# Patient Record
Sex: FEMALE | URBAN - METROPOLITAN AREA
[De-identification: names, ages, dates, MRNs, and addresses within clinical notes are randomized per-mention and may not be internally consistent; named-entity substitution may affect disease eponyms.]

---

## 2020-11-12 ENCOUNTER — APPOINTMENT (RX ONLY)
Dept: URBAN - METROPOLITAN AREA CLINIC 55 | Facility: CLINIC | Age: 75
Setting detail: DERMATOLOGY
End: 2020-11-12

## 2020-11-12 DIAGNOSIS — Z41.9 ENCOUNTER FOR PROCEDURE FOR PURPOSES OTHER THAN REMEDYING HEALTH STATE, UNSPECIFIED: ICD-10-CM

## 2020-11-12 PROCEDURE — ? FILLERS

## 2020-11-12 NOTE — PROCEDURE: FILLERS
Nasolabial Folds Filler Volume In Cc: 0
Lot #: [id number]
Detail Level: Simple
Include Cannula Information In Note?: No
Filler: Oneida Espinoza
Post-Care Instructions: After care instructions were provided to the patient.
Marionette Lines Filler Volume In Cc: 1
Consent: Written consent was obtained.
Include Cannula Size?: 27G
Anesthesia Volume In Cc: 0.5
Expiration Date (Month Year): 02/28/2023
Expiration Date (Month Year): 2/28/2023
Map Statment: See Attach Map for Complete Details
Anesthesia Type: 1% lidocaine with epinephrine

## 2020-12-09 ENCOUNTER — APPOINTMENT (RX ONLY)
Dept: URBAN - METROPOLITAN AREA CLINIC 55 | Facility: CLINIC | Age: 75
Setting detail: DERMATOLOGY
End: 2020-12-09

## 2020-12-09 DIAGNOSIS — Z41.9 ENCOUNTER FOR PROCEDURE FOR PURPOSES OTHER THAN REMEDYING HEALTH STATE, UNSPECIFIED: ICD-10-CM

## 2020-12-09 PROCEDURE — ? PLATELET RICH PLASMA INJECTION

## 2020-12-09 NOTE — PROCEDURE: PLATELET RICH PLASMA INJECTION
Depth In Mm (Will Not Render If 0): 0
Detail Level: Zone
Treatment Number (Optional): 4
Which Technique?: Default
Consent: Written consent obtained, risks reviewed including but not limited to pain, scarring, infection and incomplete improvement.  Patient understands the procedure is cosmetic in nature and will require out of pocket payment.
Post-Care Instructions: After the procedure, take precautions agains sun exposure. Do not apply sunscreen for 12 hours after the procedure. Do not apply make-up for 12 hours after the procedure. Avoid alcohol based toners for 10-14 days. After 2-3 days patients can return to their regular skin regimen.
Amount Of Blood Collected (Optional - Include Units): 22 mL
Show Additional Techniques: Yes
Amount Of Blood Processed (Optional - Include Units): 12 mL

## 2021-01-15 ENCOUNTER — APPOINTMENT (RX ONLY)
Dept: URBAN - METROPOLITAN AREA CLINIC 55 | Facility: CLINIC | Age: 76
Setting detail: DERMATOLOGY
End: 2021-01-15

## 2021-02-11 ENCOUNTER — APPOINTMENT (RX ONLY)
Dept: URBAN - METROPOLITAN AREA CLINIC 55 | Facility: CLINIC | Age: 76
Setting detail: DERMATOLOGY
End: 2021-02-11

## 2021-02-11 DIAGNOSIS — Z41.9 ENCOUNTER FOR PROCEDURE FOR PURPOSES OTHER THAN REMEDYING HEALTH STATE, UNSPECIFIED: ICD-10-CM

## 2021-02-11 PROCEDURE — ? PLATELET RICH PLASMA INJECTION

## 2021-02-11 NOTE — PROCEDURE: PLATELET RICH PLASMA INJECTION
Amount Injected At This Location In Cc (Will Not Render If 0): 0
Show Additional Techniques: Yes
Treatment Number (Optional): 10
Detail Level: Zone
Amount Of Blood Processed (Optional - Include Units): 12 mL
Consent: Written consent obtained, risks reviewed including but not limited to pain, scarring, infection and incomplete improvement.  Patient understands the procedure is cosmetic in nature and will require out of pocket payment.
Amount Of Blood Collected (Optional - Include Units): 22 mL
Which Technique?: Default

## 2021-04-06 ENCOUNTER — APPOINTMENT (RX ONLY)
Dept: URBAN - METROPOLITAN AREA CLINIC 55 | Facility: CLINIC | Age: 76
Setting detail: DERMATOLOGY
End: 2021-04-06

## 2021-04-06 DIAGNOSIS — Z41.9 ENCOUNTER FOR PROCEDURE FOR PURPOSES OTHER THAN REMEDYING HEALTH STATE, UNSPECIFIED: ICD-10-CM

## 2021-04-06 PROCEDURE — ? PLATELET RICH PLASMA INJECTION

## 2021-04-06 NOTE — PROCEDURE: PLATELET RICH PLASMA INJECTION
Amount Injected At This Location In Cc (Will Not Render If 0): 0
Treatment Number (Optional): 11
Which Technique?: Default
Consent: Verbal consent obtained, risks reviewed including but not limited to pain, scarring, infection and incomplete improvement.  Patient understands the procedure is cosmetic in nature and will require out of pocket payment.
Amount Of Blood Collected (Optional - Include Units): 22 mL
Amount Of Blood Processed (Optional - Include Units): 12 mL
Standard Default Technique For Making Prp: Applied topically during MC[facility]
Post-Care Instructions: After the procedure, take precautions agains sun exposure. After 2-3 days patients can return to their regular skin regimen.
Show Additional Techniques: Yes
Detail Level: Zone

## 2021-06-09 ENCOUNTER — APPOINTMENT (RX ONLY)
Dept: URBAN - METROPOLITAN AREA CLINIC 55 | Facility: CLINIC | Age: 76
Setting detail: DERMATOLOGY
End: 2021-06-09

## 2021-06-09 DIAGNOSIS — Z41.9 ENCOUNTER FOR PROCEDURE FOR PURPOSES OTHER THAN REMEDYING HEALTH STATE, UNSPECIFIED: ICD-10-CM

## 2021-06-09 PROCEDURE — ? FILLERS

## 2021-06-09 NOTE — PROCEDURE: FILLERS
Additional Area 2 Volume In Cc: 0
Expiration Date (Month Year): 01/31/2023
Include Cannula Information In Note?: No
Consent: Written consent was obtained.
Post-Care Instructions: After care instructions were provided to the patient.
Include Cannula Information In Note?: Yes
Anesthesia Type: 1% lidocaine with epinephrine
Lot #: [id number]
Anesthesia Volume In Cc: 0.5
Include Cannula Size?: 27G
Filler: Oneida Espinoza
Expiration Date (Month Year): 12/31/23
Map Statment: See Attach Map for Complete Details
Detail Level: Simple

## 2021-07-06 ENCOUNTER — APPOINTMENT (RX ONLY)
Dept: URBAN - METROPOLITAN AREA CLINIC 55 | Facility: CLINIC | Age: 76
Setting detail: DERMATOLOGY
End: 2021-07-06

## 2021-07-06 DIAGNOSIS — Z41.9 ENCOUNTER FOR PROCEDURE FOR PURPOSES OTHER THAN REMEDYING HEALTH STATE, UNSPECIFIED: ICD-10-CM

## 2021-07-06 PROCEDURE — ? PLATELET RICH PLASMA INJECTION

## 2021-07-06 NOTE — PROCEDURE: PLATELET RICH PLASMA INJECTION
Detail Level: Zone
Treatment Number (Optional): 12
Depth In Mm (Will Not Render If 0): 0
Which Technique?: Default
Amount Of Blood Processed (Optional - Include Units): 12 mL
Amount Of Blood Collected (Optional - Include Units): 22 mL
Post-Care Instructions: After the procedure, take precautions agains sun exposure. After 2-3 days patients can return to their regular skin regimen.
Show Additional Techniques: Yes
Consent: Verbal consent obtained, risks reviewed including but not limited to pain, scarring, infection and incomplete improvement.  Patient understands the procedure is cosmetic in nature and will require out of pocket payment.

## 2021-09-07 ENCOUNTER — APPOINTMENT (RX ONLY)
Dept: URBAN - METROPOLITAN AREA CLINIC 55 | Facility: CLINIC | Age: 76
Setting detail: DERMATOLOGY
End: 2021-09-07

## 2021-09-07 DIAGNOSIS — Z41.9 ENCOUNTER FOR PROCEDURE FOR PURPOSES OTHER THAN REMEDYING HEALTH STATE, UNSPECIFIED: ICD-10-CM

## 2021-09-07 PROCEDURE — ? PLATELET RICH PLASMA INJECTION

## 2021-09-07 NOTE — PROCEDURE: PLATELET RICH PLASMA INJECTION
Amount Injected At This Location In Cc (Will Not Render If 0): 0
Amount Of Blood Collected (Optional - Include Units): 22 mL
Treatment Number (Optional): 13
Amount Of Blood Processed (Optional - Include Units): 9 mL
Post-Care Instructions: After the procedure, take precautions agains sun exposure. After 2-3 days patients can return to their regular skin regimen.
Detail Level: Zone
Which Technique?: Default
Consent: Verbal consent obtained, risks reviewed including but not limited to pain, scarring, infection and incomplete improvement.  Patient understands the procedure is cosmetic in nature and will require out of pocket payment.
Show Additional Techniques: Yes

## 2021-10-12 ENCOUNTER — APPOINTMENT (RX ONLY)
Dept: URBAN - METROPOLITAN AREA CLINIC 55 | Facility: CLINIC | Age: 76
Setting detail: DERMATOLOGY
End: 2021-10-12

## 2021-10-12 DIAGNOSIS — Z41.9 ENCOUNTER FOR PROCEDURE FOR PURPOSES OTHER THAN REMEDYING HEALTH STATE, UNSPECIFIED: ICD-10-CM

## 2021-10-12 PROCEDURE — ? PLATELET RICH PLASMA INJECTION

## 2021-10-12 NOTE — PROCEDURE: PLATELET RICH PLASMA INJECTION
Depth In Mm (Will Not Render If 0): 0
Detail Level: Zone
Treatment Number (Optional): 14
Which Technique?: Default
Consent: Verbal consent obtained, risks reviewed including but not limited to pain, scarring, infection and incomplete improvement.  Patient understands the procedure is cosmetic in nature and will require out of pocket payment.
Amount Of Blood Processed (Optional - Include Units): 10 mL
Post-Care Instructions: After the procedure, take precautions agains sun exposure. After 2-3 days patients can return to their regular skin regimen.
Show Additional Techniques: Yes
Amount Of Blood Collected (Optional - Include Units): 22 mL

## 2021-11-16 ENCOUNTER — APPOINTMENT (RX ONLY)
Dept: URBAN - METROPOLITAN AREA CLINIC 55 | Facility: CLINIC | Age: 76
Setting detail: DERMATOLOGY
End: 2021-11-16

## 2021-11-16 DIAGNOSIS — Z41.9 ENCOUNTER FOR PROCEDURE FOR PURPOSES OTHER THAN REMEDYING HEALTH STATE, UNSPECIFIED: ICD-10-CM

## 2021-11-16 PROCEDURE — ? PLATELET RICH PLASMA INJECTION

## 2021-11-16 NOTE — PROCEDURE: PLATELET RICH PLASMA INJECTION
Depth In Mm (Will Not Render If 0): 0
Consent: Verbal consent obtained, risks reviewed including but not limited to pain, scarring, infection and incomplete improvement.  Patient understands the procedure is cosmetic in nature and will require out of pocket payment.
Show Additional Techniques: Yes
Which Technique?: Default
Amount Of Blood Collected (Optional - Include Units): 22 mL
Post-Care Instructions: After the procedure, take precautions agains sun exposure. After 2-3 days patients can return to their regular skin regimen.
Amount Of Blood Processed (Optional - Include Units): 9 mL
Detail Level: Zone

## 2021-12-02 ENCOUNTER — APPOINTMENT (RX ONLY)
Dept: URBAN - METROPOLITAN AREA CLINIC 55 | Facility: CLINIC | Age: 76
Setting detail: DERMATOLOGY
End: 2021-12-02

## 2021-12-02 DIAGNOSIS — Z41.9 ENCOUNTER FOR PROCEDURE FOR PURPOSES OTHER THAN REMEDYING HEALTH STATE, UNSPECIFIED: ICD-10-CM

## 2021-12-02 PROCEDURE — ? FILLERS

## 2021-12-02 NOTE — PROCEDURE: FILLERS
Tear Troughs Filler Volume In Cc: 0
Include Cannula Size?: 27G
Consent: Written consent was obtained.
Filler: Oneida Espinoza
Include Cannula Information In Note?: No
Lot #: [id number]
Anesthesia Type: 1% lidocaine with epinephrine
Post-Care Instructions: After care instructions were provided to the patient.
Lot #: [id number]
Expiration Date (Month Year): 4/30/24
Include Cannula Information In Note?: Yes
Anesthesia Volume In Cc: 0.5
Map Statment: See Attach Map for Complete Details
Expiration Date (Month Year): 11/2023
Detail Level: Simple

## 2021-12-07 ENCOUNTER — APPOINTMENT (RX ONLY)
Dept: URBAN - METROPOLITAN AREA CLINIC 55 | Facility: CLINIC | Age: 76
Setting detail: DERMATOLOGY
End: 2021-12-07

## 2021-12-07 DIAGNOSIS — Z41.9 ENCOUNTER FOR PROCEDURE FOR PURPOSES OTHER THAN REMEDYING HEALTH STATE, UNSPECIFIED: ICD-10-CM

## 2021-12-07 PROCEDURE — ? PLATELET RICH PLASMA INJECTION

## 2021-12-07 NOTE — PROCEDURE: PLATELET RICH PLASMA INJECTION
Amount Injected At This Location In Cc (Will Not Render If 0): 0
Post-Care Instructions: After the procedure, take precautions agains sun exposure. After 2-3 days patients can return to their regular skin regimen.
Which Technique?: Default
Amount Of Blood Collected (Optional - Include Units): 22 mL
Consent: Verbal consent obtained, risks reviewed including but not limited to pain, scarring, infection and incomplete improvement.  Patient understands the procedure is cosmetic in nature and will require out of pocket payment.
Detail Level: Zone
Amount Of Blood Processed (Optional - Include Units): 9 mL
Show Additional Techniques: Yes

## 2022-01-11 ENCOUNTER — APPOINTMENT (RX ONLY)
Dept: URBAN - METROPOLITAN AREA CLINIC 55 | Facility: CLINIC | Age: 77
Setting detail: DERMATOLOGY
End: 2022-01-11

## 2022-01-11 DIAGNOSIS — Z41.9 ENCOUNTER FOR PROCEDURE FOR PURPOSES OTHER THAN REMEDYING HEALTH STATE, UNSPECIFIED: ICD-10-CM

## 2022-01-11 PROCEDURE — ? PLATELET RICH PLASMA INJECTION

## 2022-01-11 NOTE — PROCEDURE: PLATELET RICH PLASMA INJECTION
Post-Care Instructions: After the procedure, take precautions agains sun exposure. After 2-3 days patients can return to their regular skin regimen.
Depth In Mm (Will Not Render If 0): 0
Consent: Verbal consent obtained, risks reviewed including but not limited to pain, scarring, infection and incomplete improvement.  Patient understands the procedure is cosmetic in nature and will require out of pocket payment.
Amount Of Blood Collected (Optional - Include Units): 22 mL
Detail Level: Zone
Standard Default Technique For Making Prp: Cannula
Which Technique?: Default
Show Additional Techniques: Yes
Amount Of Blood Processed (Optional - Include Units): 10 mL

## 2022-04-26 ENCOUNTER — APPOINTMENT (RX ONLY)
Dept: URBAN - METROPOLITAN AREA CLINIC 55 | Facility: CLINIC | Age: 77
Setting detail: DERMATOLOGY
End: 2022-04-26

## 2022-04-26 DIAGNOSIS — Z41.9 ENCOUNTER FOR PROCEDURE FOR PURPOSES OTHER THAN REMEDYING HEALTH STATE, UNSPECIFIED: ICD-10-CM

## 2022-04-26 PROCEDURE — ? PLATELET RICH PLASMA INJECTION

## 2022-04-26 NOTE — PROCEDURE: PLATELET RICH PLASMA INJECTION
Show Additional Techniques: Yes
Amount Of Blood Collected (Optional - Include Units): 22 mL
Site Of Venipuncture?: Left AC
Amount Injected At This Location In Cc (Will Not Render If 0): 0
Venipuncture Paragraph: An alcohol pad was applied to the venipuncture site. Venipuncture was performed using a butterfly needle. Pressure and a bandaid was applied to the site. No complications were noted.
Standard Default Technique For Making Prp: 29g needles used for injections
Amount Of Blood Processed (Optional - Include Units): 9 mL
Post-Care Instructions: After the procedure, take precautions agains sun exposure. After 2-3 days patients can return to their regular skin regimen.
External Cooling Fan Speed: 5
Detail Level: Zone
Consent: Verbal consent obtained, risks reviewed including but not limited to pain, scarring, infection and incomplete improvement.  Patient understands the procedure is cosmetic in nature and will require out of pocket payment.
Anesthesia Type: 1% lidocaine with epinephrine
Which Technique?: Default

## 2022-06-14 ENCOUNTER — APPOINTMENT (RX ONLY)
Dept: URBAN - METROPOLITAN AREA CLINIC 55 | Facility: CLINIC | Age: 77
Setting detail: DERMATOLOGY
End: 2022-06-14

## 2022-06-14 DIAGNOSIS — Z41.9 ENCOUNTER FOR PROCEDURE FOR PURPOSES OTHER THAN REMEDYING HEALTH STATE, UNSPECIFIED: ICD-10-CM

## 2022-06-14 PROCEDURE — ? FILLERS

## 2022-06-14 NOTE — PROCEDURE: FILLERS
Additional Area 3 Volume In Cc: 0
Use Map Statement For Sites (Optional): No
Expiration Date (Month Year): 10/31/2023
Post-Care Instructions: After care instructions were provided to the patient.
Map Statment: See Attach Map for Complete Details
Lot #: [id number]
Include Cannula Size?: 27G
Include Cannula Information In Note?: Yes
Anesthesia Type: 1% lidocaine with epinephrine
Aspiration Statement: Aspiration was performed prior to injecting site with filler.
Detail Level: Simple
Anesthesia Volume In Cc: 0.5
Filler: Oneida Espinoza
Consent: Written consent was obtained.

## 2022-06-29 ENCOUNTER — APPOINTMENT (RX ONLY)
Dept: URBAN - METROPOLITAN AREA CLINIC 55 | Facility: CLINIC | Age: 77
Setting detail: DERMATOLOGY
End: 2022-06-29

## 2022-06-29 DIAGNOSIS — Z41.9 ENCOUNTER FOR PROCEDURE FOR PURPOSES OTHER THAN REMEDYING HEALTH STATE, UNSPECIFIED: ICD-10-CM

## 2022-06-29 PROCEDURE — ? PLATELET RICH PLASMA INJECTION

## 2022-06-29 NOTE — PROCEDURE: PLATELET RICH PLASMA INJECTION
Amount Injected At This Location In Cc (Will Not Render If 0): 0
Amount Of Blood Collected (Optional - Include Units): 22 mL
Standard Default Technique For Making Prp: 29g needles used for injections
Which Technique?: Default
External Cooling Fan Speed: 5
Show Additional Techniques: Yes
Detail Level: Zone
Anesthesia Type: 1% lidocaine with epinephrine
Consent: Verbal consent obtained, risks reviewed including but not limited to pain, scarring, infection and incomplete improvement.  Patient understands the procedure is cosmetic in nature and will require out of pocket payment.
Amount Of Blood Processed (Optional - Include Units): 10 mL
Post-Care Instructions: After the procedure, take precautions agains sun exposure. After 2-3 days patients can return to their regular skin regimen.
Venipuncture Paragraph: An alcohol pad was applied to the venipuncture site. Venipuncture was performed using a butterfly needle. Pressure and a bandaid was applied to the site. No complications were noted.
Site Of Venipuncture?: Left AC

## 2022-07-06 ENCOUNTER — APPOINTMENT (RX ONLY)
Dept: URBAN - METROPOLITAN AREA CLINIC 55 | Facility: CLINIC | Age: 77
Setting detail: DERMATOLOGY
End: 2022-07-06

## 2022-07-06 DIAGNOSIS — Z41.9 ENCOUNTER FOR PROCEDURE FOR PURPOSES OTHER THAN REMEDYING HEALTH STATE, UNSPECIFIED: ICD-10-CM

## 2022-07-06 PROCEDURE — ? PLATELET RICH PLASMA INJECTION

## 2022-07-06 NOTE — PROCEDURE: PLATELET RICH PLASMA INJECTION
Depth In Mm (Will Not Render If 0): 0
Site Of Venipuncture?: Left AC
Detail Level: Zone
Standard Default Technique For Making Prp: 29g needles used for injections
Venipuncture Paragraph: An alcohol pad was applied to the venipuncture site. Venipuncture was performed using a butterfly needle. Pressure and a bandaid was applied to the site. No complications were noted.
Consent: Verbal consent obtained, risks reviewed including but not limited to pain, scarring, infection and incomplete improvement.  Patient understands the procedure is cosmetic in nature and will require out of pocket payment.
Post-Care Instructions: After the procedure, take precautions agains sun exposure. After 2-3 days patients can return to their regular skin regimen.
Anesthesia Type: 1% lidocaine with epinephrine
Which Technique?: Default
Amount Of Blood Collected (Optional - Include Units): 22 mL
Show Additional Techniques: Yes
External Cooling Fan Speed: 5
Amount Of Blood Processed (Optional - Include Units): 10 mL

## 2022-08-16 ENCOUNTER — APPOINTMENT (RX ONLY)
Dept: URBAN - METROPOLITAN AREA CLINIC 55 | Facility: CLINIC | Age: 77
Setting detail: DERMATOLOGY
End: 2022-08-16

## 2022-08-16 DIAGNOSIS — Z41.9 ENCOUNTER FOR PROCEDURE FOR PURPOSES OTHER THAN REMEDYING HEALTH STATE, UNSPECIFIED: ICD-10-CM

## 2022-08-16 PROCEDURE — ? PLATELET RICH PLASMA INJECTION

## 2022-08-16 PROCEDURE — ? INVENTORY

## 2022-08-16 NOTE — PROCEDURE: PLATELET RICH PLASMA INJECTION
Depth In Mm (Will Not Render If 0): 0
Post-Care Instructions: Physical sunscreen applied post treatment and an ice pack provided. After 2-3 days patients can return to their regular skin regimen.
Which Technique?: Default
Venipuncture Paragraph: An alcohol pad was used to cleanse venipuncture site. Venipuncture was performed using a butterfly needle. Pressure and coflex was applied to the site. No complications were noted.
Anesthesia Type: 1% lidocaine with epinephrine
Show Additional Techniques: Yes
Amount Of Blood Collected (Optional - Include Units): 22 mL
Topical Anesthesia Type: 20% benzocaine, 8% lidocaine, 4% tetracaine
External Cooling Fan Speed: 5
Amount Of Blood Processed (Optional - Include Units): 9 mL
Consent obtained, risks reviewed.
Site Of Venipuncture?: Left AC
Detail Level: Zone

## 2022-10-20 ENCOUNTER — APPOINTMENT (RX ONLY)
Dept: URBAN - METROPOLITAN AREA CLINIC 55 | Facility: CLINIC | Age: 77
Setting detail: DERMATOLOGY
End: 2022-10-20

## 2022-10-20 DIAGNOSIS — Z41.9 ENCOUNTER FOR PROCEDURE FOR PURPOSES OTHER THAN REMEDYING HEALTH STATE, UNSPECIFIED: ICD-10-CM

## 2022-10-20 PROCEDURE — ? PLATELET RICH PLASMA INJECTION

## 2022-10-20 PROCEDURE — ? INVENTORY

## 2022-10-20 NOTE — PROCEDURE: PLATELET RICH PLASMA INJECTION
Treatment Number (Optional): 0
Site Of Venipuncture?: Left AC
Venipuncture Paragraph: An alcohol pad was used to cleanse venipuncture site. Venipuncture was performed using a butterfly needle. Pressure and coflex was applied to the site. No complications were noted.
Amount Of Blood Collected (Optional - Include Units): 22 mL
Anesthesia Type: 1% lidocaine with epinephrine
Detail Level: Zone
Consent obtained, risks reviewed.
Amount Of Blood Processed (Optional - Include Units): 9 mL
External Cooling Fan Speed: 5
Show Additional Techniques: Yes
Which Technique?: Default
Topical Anesthesia Type: 20% benzocaine, 8% lidocaine, 4% tetracaine
Post-Care Instructions: Physical sunscreen applied post treatment and an ice pack provided. After 2-3 days patients can return to their regular skin regimen.

## 2022-11-03 ENCOUNTER — APPOINTMENT (RX ONLY)
Dept: URBAN - METROPOLITAN AREA CLINIC 55 | Facility: CLINIC | Age: 77
Setting detail: DERMATOLOGY
End: 2022-11-03

## 2022-11-03 DIAGNOSIS — Z41.9 ENCOUNTER FOR PROCEDURE FOR PURPOSES OTHER THAN REMEDYING HEALTH STATE, UNSPECIFIED: ICD-10-CM

## 2022-11-03 PROCEDURE — ? FILLERS

## 2022-11-03 PROCEDURE — ? INVENTORY

## 2022-11-03 NOTE — PROCEDURE: FILLERS
Mid Face Filler Volume In Cc: 0
Include Cannula Information In Note?: Yes
Include Cannula Information In Note?: No
Anesthesia Volume In Cc: 0.5
Consent was obtained.
Post-Care Instructions: After care instructions were provided verbally and in writing.
Include Cannula Size?: 27G
Detail Level: Detailed
Filler: Oneida Espinoza
Map Statment: See Attach Map for Complete Details
Anesthesia Type: 1% lidocaine with epinephrine

## 2022-12-01 ENCOUNTER — APPOINTMENT (RX ONLY)
Dept: URBAN - METROPOLITAN AREA CLINIC 55 | Facility: CLINIC | Age: 77
Setting detail: DERMATOLOGY
End: 2022-12-01

## 2022-12-01 DIAGNOSIS — Z41.9 ENCOUNTER FOR PROCEDURE FOR PURPOSES OTHER THAN REMEDYING HEALTH STATE, UNSPECIFIED: ICD-10-CM

## 2022-12-01 PROCEDURE — ? PLATELET RICH PLASMA INJECTION

## 2022-12-01 PROCEDURE — ? INVENTORY

## 2022-12-01 NOTE — PROCEDURE: PLATELET RICH PLASMA INJECTION
Amount Injected At This Location In Cc (Will Not Render If 0): 0
Topical Anesthesia Type: 20% benzocaine, 8% lidocaine, 4% tetracaine
Site Of Venipuncture?: Left AC
Which Technique?: Default
Venipuncture Paragraph: An alcohol pad was used to cleanse venipuncture site. Venipuncture was performed using a butterfly needle. Pressure and coflex was applied to the site. No complications were noted.
Anesthesia Type: 1% lidocaine with epinephrine
Amount Of Blood Collected (Optional - Include Units): 22 mL
Show Additional Techniques: Yes
Consent obtained, risks reviewed.
External Cooling Fan Speed: 5
Amount Of Blood Processed (Optional - Include Units): 9 mL
Detail Level: Zone
Post-Care Instructions: Physical sunscreen applied post treatment and an ice pack provided. After 2-3 days patients can return to their regular skin regimen.

## 2023-03-31 ENCOUNTER — APPOINTMENT (RX ONLY)
Dept: URBAN - METROPOLITAN AREA CLINIC 55 | Facility: CLINIC | Age: 78
Setting detail: DERMATOLOGY
End: 2023-03-31

## 2023-03-31 DIAGNOSIS — Z41.9 ENCOUNTER FOR PROCEDURE FOR PURPOSES OTHER THAN REMEDYING HEALTH STATE, UNSPECIFIED: ICD-10-CM

## 2023-03-31 PROCEDURE — ? FILLERS

## 2023-03-31 PROCEDURE — ? INVENTORY

## 2023-03-31 NOTE — PROCEDURE: FILLERS
Additional Area 2 Volume In Cc: 0
Filler: RHA 3
Map Statment: See Attach Map for Complete Details
Filler: RHA 4
Include Documentation That Aspiration Was Performed Prior To Injecting Filler:: No
Include Cannula Information In Note?: Yes
Anesthesia Type: 1% lidocaine with epinephrine
Include Cannula Size?: 27G
Anesthesia Volume In Cc: 0.5
Detail Level: Detailed
Consent was obtained.
Post-Care Instructions: After care instructions were provided verbally and in writing.

## 2023-05-09 ENCOUNTER — APPOINTMENT (RX ONLY)
Dept: URBAN - METROPOLITAN AREA CLINIC 55 | Facility: CLINIC | Age: 78
Setting detail: DERMATOLOGY
End: 2023-05-09

## 2023-05-09 DIAGNOSIS — Z41.9 ENCOUNTER FOR PROCEDURE FOR PURPOSES OTHER THAN REMEDYING HEALTH STATE, UNSPECIFIED: ICD-10-CM

## 2023-05-09 PROCEDURE — ? INVENTORY

## 2023-05-09 PROCEDURE — ? PLATELET RICH PLASMA INJECTION

## 2023-05-09 PROCEDURE — ? COSMETIC FOLLOW-UP

## 2023-05-09 NOTE — PROCEDURE: PLATELET RICH PLASMA INJECTION
Site Of Venipuncture?: Left AC
Depth In Mm (Will Not Render If 0): 0
Amount Of Blood Collected (Optional - Include Units): 22 ml
Consent obtained, risks reviewed.
Topical Anesthesia Type: 20% benzocaine, 8% lidocaine, 4% tetracaine
Venipuncture Paragraph: An alcohol pad was used to cleanse venipuncture site. Venipuncture was performed using a butterfly needle. Pressure and coflex was applied to the site. No complications were noted.
Which Technique?: Default
Anesthesia Type: 1% lidocaine with epinephrine
Show Additional Techniques: Yes
Post-Care Instructions: Physical sunscreen applied post treatment and an ice pack provided. After 2-3 days patients can return to their regular skin regimen.
Detail Level: Zone
External Cooling Fan Speed: 5

## 2023-06-19 ENCOUNTER — APPOINTMENT (RX ONLY)
Dept: URBAN - METROPOLITAN AREA CLINIC 55 | Facility: CLINIC | Age: 78
Setting detail: DERMATOLOGY
End: 2023-06-19

## 2023-06-19 DIAGNOSIS — Z41.9 ENCOUNTER FOR PROCEDURE FOR PURPOSES OTHER THAN REMEDYING HEALTH STATE, UNSPECIFIED: ICD-10-CM

## 2023-06-19 PROCEDURE — ? INVENTORY

## 2023-06-19 PROCEDURE — ? PLATELET RICH PLASMA INJECTION

## 2023-06-19 PROCEDURE — ? COSMETIC CONSULTATION: GENERAL

## 2023-06-19 NOTE — PROCEDURE: PLATELET RICH PLASMA INJECTION
Depth In Mm (Will Not Render If 0): 0
Amount Of Blood Collected (Optional - Include Units): 22 mL
Anesthesia Type: 1% lidocaine with epinephrine
Venipuncture Paragraph: An alcohol pad was used to cleanse venipuncture site. Venipuncture was performed using a butterfly needle. Pressure and coflex was applied to the site. No complications were noted.
Amount Of Blood Processed (Optional - Include Units): 9 mL
Detail Level: Zone
External Cooling Fan Speed: 5
Consent obtained, risks reviewed.
Which Technique?: Default
Show Additional Techniques: Yes
Topical Anesthesia Type: 20% benzocaine, 8% lidocaine, 4% tetracaine
Site Of Venipuncture?: Left and right AC
Post-Care Instructions: Physical sunscreen applied post treatment and an ice pack provided. After 2-3 days patients can return to their regular skin regimen.

## 2023-07-17 ENCOUNTER — APPOINTMENT (RX ONLY)
Dept: URBAN - METROPOLITAN AREA CLINIC 55 | Facility: CLINIC | Age: 78
Setting detail: DERMATOLOGY
End: 2023-07-17

## 2023-07-17 DIAGNOSIS — Z41.9 ENCOUNTER FOR PROCEDURE FOR PURPOSES OTHER THAN REMEDYING HEALTH STATE, UNSPECIFIED: ICD-10-CM

## 2023-07-17 PROCEDURE — ? INVENTORY

## 2023-07-17 PROCEDURE — ? PLATELET RICH PLASMA INJECTION

## 2023-07-17 NOTE — PROCEDURE: PLATELET RICH PLASMA INJECTION
Depth In Mm (Will Not Render If 0): 0
Who Performed The Venipuncture?: RYANN Álvarez
Detail Level: Zone
Venipuncture Paragraph: An alcohol pad was used to cleanse venipuncture site. Venipuncture was performed using a butterfly needle. Pressure and coflex was applied to the site. No complications were noted.
Which Technique?: Default
External Cooling Fan Speed: 5
Number Of Tubes Drawn: 1
Show Additional Techniques: Yes
Consent obtained, risks reviewed.
Anesthesia Type: 1% lidocaine with epinephrine
Topical Anesthesia Type: 20% benzocaine, 8% lidocaine, 4% tetracaine
Post-Care Instructions: Physical sunscreen applied post treatment and an ice pack provided. After 2-3 days patients can return to their regular skin regimen.
Site Of Venipuncture?: Left AC

## 2023-08-11 ENCOUNTER — APPOINTMENT (RX ONLY)
Dept: URBAN - METROPOLITAN AREA CLINIC 55 | Facility: CLINIC | Age: 78
Setting detail: DERMATOLOGY
End: 2023-08-11

## 2023-08-11 DIAGNOSIS — Z41.9 ENCOUNTER FOR PROCEDURE FOR PURPOSES OTHER THAN REMEDYING HEALTH STATE, UNSPECIFIED: ICD-10-CM

## 2023-08-11 PROCEDURE — ? FILLERS

## 2023-08-11 PROCEDURE — ? INVENTORY

## 2023-08-11 NOTE — PROCEDURE: FILLERS
Tear Troughs Filler Volume In Cc: 0
Include Cannula Information In Note?: No
Filler: RHA Redensity
Detail Level: Detailed
Filler: RHA 4
Map Statment: See Attach Map for Complete Details
Include Cannula Information In Note?: Yes
Include Cannula Size?: 27G
Post-Care Instructions: After care instructions were provided verbally and in writing.
Anesthesia Type: 1% lidocaine with epinephrine
Consent was obtained.
Anesthesia Volume In Cc: 0.5

## 2023-08-21 ENCOUNTER — APPOINTMENT (RX ONLY)
Dept: URBAN - METROPOLITAN AREA CLINIC 55 | Facility: CLINIC | Age: 78
Setting detail: DERMATOLOGY
End: 2023-08-21

## 2023-08-21 DIAGNOSIS — Z41.9 ENCOUNTER FOR PROCEDURE FOR PURPOSES OTHER THAN REMEDYING HEALTH STATE, UNSPECIFIED: ICD-10-CM

## 2023-08-21 PROCEDURE — ? INVENTORY

## 2023-08-21 PROCEDURE — ? PLATELET RICH PLASMA INJECTION

## 2023-08-21 PROCEDURE — ? COSMETIC FOLLOW-UP

## 2023-08-21 NOTE — PROCEDURE: PLATELET RICH PLASMA INJECTION
Depth In Mm (Will Not Render If 0): 0
Anesthesia Type: 1% lidocaine with epinephrine
Detail Level: Zone
Venipuncture Paragraph: An alcohol pad was used to cleanse venipuncture site. Venipuncture was performed using a butterfly needle. Pressure and coflex was applied to the site. No complications were noted.
Show Additional Techniques: Yes
Consent obtained, risks reviewed.
Which Technique?: Default
External Cooling Fan Speed: 5
Post-Care Instructions: Physical sunscreen applied post treatment and an ice pack provided. After 2-3 days patients can return to their regular skin regimen.
Amount Of Blood Collected (Optional - Include Units): 22 mL
Site Of Venipuncture?: Left AC
Amount Of Blood Processed (Optional - Include Units): 9 mL
Topical Anesthesia Type: 20% benzocaine, 8% lidocaine, 4% tetracaine

## 2023-09-18 ENCOUNTER — APPOINTMENT (RX ONLY)
Dept: URBAN - METROPOLITAN AREA CLINIC 55 | Facility: CLINIC | Age: 78
Setting detail: DERMATOLOGY
End: 2023-09-18

## 2023-09-18 DIAGNOSIS — Z41.9 ENCOUNTER FOR PROCEDURE FOR PURPOSES OTHER THAN REMEDYING HEALTH STATE, UNSPECIFIED: ICD-10-CM

## 2023-09-18 PROCEDURE — ? COSMETIC FOLLOW-UP

## 2023-09-18 PROCEDURE — ? INVENTORY

## 2023-09-18 PROCEDURE — ? PLATELET RICH PLASMA INJECTION

## 2023-09-18 ASSESSMENT — LOCATION DETAILED DESCRIPTION DERM: LOCATION DETAILED: RIGHT ANTECUBITAL SKIN

## 2023-09-18 ASSESSMENT — LOCATION SIMPLE DESCRIPTION DERM: LOCATION SIMPLE: RIGHT UPPER ARM

## 2023-09-18 ASSESSMENT — LOCATION ZONE DERM: LOCATION ZONE: ARM

## 2023-09-18 NOTE — PROCEDURE: PLATELET RICH PLASMA INJECTION
Consent obtained, risks reviewed.
Post-Care Instructions: Physical sunscreen applied post treatment and an ice pack provided. After 2-3 days patients can return to their regular skin regimen.
Amount Of Blood Collected (Optional - Include Units): 22 mL
Site Of Venipuncture?: Left AC
Depth In Mm (Will Not Render If 0): 0
Detail Level: Zone
Amount Of Blood Processed (Optional - Include Units): 9 mL
Topical Anesthesia Type: 20% benzocaine, 8% lidocaine, 4% tetracaine
Which Technique?: Default
Venipuncture Paragraph: An alcohol pad was used to cleanse venipuncture site. Venipuncture was performed using a butterfly needle. Pressure and coflex was applied to the site. No complications were noted.
Anesthesia Type: 1% lidocaine with epinephrine
Show Additional Techniques: Yes
External Cooling Fan Speed: 5

## 2023-10-30 ENCOUNTER — APPOINTMENT (RX ONLY)
Dept: URBAN - METROPOLITAN AREA CLINIC 55 | Facility: CLINIC | Age: 78
Setting detail: DERMATOLOGY
End: 2023-10-30

## 2023-10-30 DIAGNOSIS — Z41.9 ENCOUNTER FOR PROCEDURE FOR PURPOSES OTHER THAN REMEDYING HEALTH STATE, UNSPECIFIED: ICD-10-CM

## 2023-10-30 PROCEDURE — ? INVENTORY

## 2023-10-30 PROCEDURE — ? LED

## 2023-10-30 PROCEDURE — ? COSMETIC FOLLOW-UP

## 2023-10-30 PROCEDURE — ? PLATELET RICH PLASMA INJECTION

## 2023-10-30 ASSESSMENT — LOCATION DETAILED DESCRIPTION DERM: LOCATION DETAILED: INFERIOR MID FOREHEAD

## 2023-10-30 ASSESSMENT — LOCATION SIMPLE DESCRIPTION DERM: LOCATION SIMPLE: INFERIOR FOREHEAD

## 2023-10-30 ASSESSMENT — LOCATION ZONE DERM: LOCATION ZONE: FACE

## 2023-10-30 NOTE — PROCEDURE: PLATELET RICH PLASMA INJECTION
Depth In Mm (Will Not Render If 0): 0
Venipuncture Paragraph: An alcohol pad was used to cleanse venipuncture site. Venipuncture was performed using a butterfly needle. Pressure and coflex was applied to the site. No complications were noted.
Detail Level: Zone
Amount Of Blood Processed (Optional - Include Units): 9 mL
Show Additional Techniques: Yes
Consent obtained, risks reviewed.
Which Technique?: Default
Anesthesia Type: 1% lidocaine with epinephrine
Post-Care Instructions: Physical sunscreen applied post treatment and an ice pack provided. After 2-3 days patients can return to their regular skin regimen.
External Cooling Fan Speed: 5
Topical Anesthesia Type: 20% benzocaine, 8% lidocaine, 4% tetracaine
Amount Of Blood Collected (Optional - Include Units): 22 mL
Site Of Venipuncture?: Left AC

## 2023-10-30 NOTE — PROCEDURE: LED
Detail Level: Zone
Consent: Written consent obtained.  The risks were reviewed with the patient including but not limited to: pigmentary changes, discomfort, scabbing, and redness.
Illumination Time: 15 min
Number Of Prepaid Treatments (Will Not Render If 0): 0
Post-Care Instructions: I reviewed with the patient in detail post-care instructions. Patient is to wear sun protection. Patients may expect sunburn like redness, discomfort and a temporary worsening of pimples.
Light Source: Red

## 2023-11-27 ENCOUNTER — APPOINTMENT (RX ONLY)
Dept: URBAN - METROPOLITAN AREA CLINIC 55 | Facility: CLINIC | Age: 78
Setting detail: DERMATOLOGY
End: 2023-11-27

## 2023-11-27 DIAGNOSIS — Z41.9 ENCOUNTER FOR PROCEDURE FOR PURPOSES OTHER THAN REMEDYING HEALTH STATE, UNSPECIFIED: ICD-10-CM

## 2023-11-27 PROCEDURE — ? PLATELET RICH PLASMA INJECTION

## 2023-11-27 PROCEDURE — ? INVENTORY

## 2023-11-27 PROCEDURE — ? COSMETIC FOLLOW-UP

## 2023-11-27 NOTE — PROCEDURE: PLATELET RICH PLASMA INJECTION
Amount Of Blood Collected (Optional - Include Units): 22 mL
Post-Care Instructions: Physical sunscreen applied post treatment and an ice pack provided. After 2-3 days patients can return to their regular skin regimen.
External Cooling Fan Speed: 5
Detail Level: Zone
Site Of Venipuncture?: Left AC
Amount Of Blood Processed (Optional - Include Units): 9 mL
Depth In Mm (Will Not Render If 0): 0
Topical Anesthesia Type: 20% benzocaine, 8% lidocaine, 4% tetracaine
Consent obtained, risks reviewed.
Venipuncture Paragraph: An alcohol pad was used to cleanse venipuncture site. Venipuncture was performed using a butterfly needle. Pressure and coflex was applied to the site. No complications were noted.
Which Technique?: Default
Show Additional Techniques: Yes
Anesthesia Type: 1% lidocaine with epinephrine

## 2024-01-19 ENCOUNTER — APPOINTMENT (RX ONLY)
Dept: URBAN - METROPOLITAN AREA CLINIC 55 | Facility: CLINIC | Age: 79
Setting detail: DERMATOLOGY
End: 2024-01-19

## 2024-01-19 DIAGNOSIS — Z41.9 ENCOUNTER FOR PROCEDURE FOR PURPOSES OTHER THAN REMEDYING HEALTH STATE, UNSPECIFIED: ICD-10-CM

## 2024-01-19 PROCEDURE — ? COSMETIC FOLLOW-UP

## 2024-01-19 PROCEDURE — ? INVENTORY

## 2024-01-19 PROCEDURE — ? PLATELET RICH PLASMA INJECTION

## 2024-01-19 NOTE — PROCEDURE: PLATELET RICH PLASMA INJECTION
Consent obtained, risks reviewed.
Depth In Mm (Will Not Render If 0): 0
External Cooling Fan Speed: 5
Topical Anesthesia Type: 20% benzocaine, 8% lidocaine, 4% tetracaine
Show Additional Techniques: Yes
Post-Care Instructions: Physical sunscreen applied post treatment and an ice pack provided. After 2-3 days patients can return to their regular skin regimen.
Amount Of Blood Collected (Optional - Include Units): 22 mL
Amount Of Blood Processed (Optional - Include Units): 9 mL
Detail Level: Zone
Site Of Venipuncture?: Right AC
Which Technique?: Default
Anesthesia Type: 1% lidocaine with epinephrine
Venipuncture Paragraph: An alcohol pad was used to cleanse venipuncture site. Venipuncture was performed using a butterfly needle. Pressure and coflex was applied to the site. No complications were noted.

## 2024-03-08 ENCOUNTER — APPOINTMENT (RX ONLY)
Dept: URBAN - METROPOLITAN AREA CLINIC 55 | Facility: CLINIC | Age: 79
Setting detail: DERMATOLOGY
End: 2024-03-08

## 2024-03-08 DIAGNOSIS — Z41.9 ENCOUNTER FOR PROCEDURE FOR PURPOSES OTHER THAN REMEDYING HEALTH STATE, UNSPECIFIED: ICD-10-CM

## 2024-03-08 PROCEDURE — ? INVENTORY

## 2024-03-08 PROCEDURE — ? PLATELET RICH PLASMA INJECTION

## 2024-03-08 PROCEDURE — ? COSMETIC FOLLOW-UP

## 2024-03-08 NOTE — PROCEDURE: PLATELET RICH PLASMA INJECTION
Post-Care Instructions: Physical sunscreen applied post treatment and an ice pack provided. After 2-3 days patients can return to their regular skin regimen.
Amount Of Blood Collected (Optional - Include Units): 22 mL
Amount Injected At This Location In Cc (Will Not Render If 0): 0
External Cooling Fan Speed: 5
Detail Level: Zone
Site Of Venipuncture?: Right AC
Amount Of Blood Processed (Optional - Include Units): 9 mL
Venipuncture Paragraph: An alcohol pad was used to cleanse venipuncture site. Venipuncture was performed using a butterfly needle. Pressure and coflex was applied to the site. No complications were noted.
Which Technique?: Default
Topical Anesthesia Type: 20% benzocaine, 8% lidocaine, 4% tetracaine
Show Additional Techniques: Yes
Consent obtained, risks reviewed.
Anesthesia Type: 1% lidocaine with epinephrine

## 2024-03-12 ENCOUNTER — APPOINTMENT (RX ONLY)
Dept: URBAN - METROPOLITAN AREA CLINIC 55 | Facility: CLINIC | Age: 79
Setting detail: DERMATOLOGY
End: 2024-03-12

## 2024-03-12 DIAGNOSIS — Z41.9 ENCOUNTER FOR PROCEDURE FOR PURPOSES OTHER THAN REMEDYING HEALTH STATE, UNSPECIFIED: ICD-10-CM

## 2024-03-12 PROCEDURE — ? INVENTORY

## 2024-03-12 PROCEDURE — ? FILLERS

## 2024-03-12 NOTE — PROCEDURE: FILLERS
Additional Area 1 Volume In Cc: 0
Include Cannula Size?: 27G
Consent was obtained.
Post-Care Instructions: After care instructions were provided verbally and in writing.
Detail Level: Detailed
Use Map Statement For Sites (Optional): No
Filler: [facility] 4
Filler: [facility] Redensity
Map Statment: See Attach Map for Complete Details
Anesthesia Type: 1% lidocaine with epinephrine
Include Cannula Information In Note?: Yes
Anesthesia Volume In Cc: 0.5

## 2024-07-09 ENCOUNTER — APPOINTMENT (RX ONLY)
Dept: URBAN - METROPOLITAN AREA CLINIC 55 | Facility: CLINIC | Age: 79
Setting detail: DERMATOLOGY
End: 2024-07-09

## 2024-07-09 DIAGNOSIS — Z41.9 ENCOUNTER FOR PROCEDURE FOR PURPOSES OTHER THAN REMEDYING HEALTH STATE, UNSPECIFIED: ICD-10-CM

## 2024-07-09 PROCEDURE — ? INVENTORY

## 2024-07-09 PROCEDURE — ? PLATELET RICH PLASMA INJECTION

## 2024-07-09 NOTE — PROCEDURE: PLATELET RICH PLASMA INJECTION
Consent obtained, risks reviewed.
Show Additional Techniques: Yes
Depth In Mm (Will Not Render If 0): 0
Anesthesia Type: 1% lidocaine with epinephrine
Detail Level: Zone
Venipuncture Paragraph: An alcohol pad was used to cleanse venipuncture site. Venipuncture was performed using a butterfly needle. Pressure and coflex was applied to the site. No complications were noted.
Amount Of Blood Processed (Optional - Include Units): 9 mL
Amount Of Blood Collected (Optional - Include Units): 22 mL
Site Of Venipuncture?: Right AC
Topical Anesthesia Type: 20% benzocaine, 8% lidocaine, 4% tetracaine
Post-Care Instructions: Physical sunscreen applied post treatment and an ice pack provided. After 2-3 days patients can return to their regular skin regimen.
Which Technique?: Default
External Cooling Fan Speed: 5

## 2024-08-06 ENCOUNTER — APPOINTMENT (RX ONLY)
Dept: URBAN - METROPOLITAN AREA CLINIC 55 | Facility: CLINIC | Age: 79
Setting detail: DERMATOLOGY
End: 2024-08-06

## 2024-08-06 DIAGNOSIS — Z41.9 ENCOUNTER FOR PROCEDURE FOR PURPOSES OTHER THAN REMEDYING HEALTH STATE, UNSPECIFIED: ICD-10-CM

## 2024-08-06 PROCEDURE — ? FILLERS

## 2024-08-06 PROCEDURE — ? INVENTORY

## 2024-08-06 NOTE — PROCEDURE: FILLERS
Include Cannula Information In Note?: Yes
Include Cannula Information In Note?: No
Anesthesia Type: 1% lidocaine with epinephrine
Lateral Face Filler Volume In Cc: 0
Anesthesia Volume In Cc: 0.5
Include Cannula Size?: 27G
Consent was obtained.
Detail Level: Detailed
Post-Care Instructions: After care instructions were provided verbally and in writing.
Filler: Restylane Contour
Map Statment: See Attach Map for Complete Details

## 2024-09-03 ENCOUNTER — APPOINTMENT (RX ONLY)
Dept: URBAN - METROPOLITAN AREA CLINIC 55 | Facility: CLINIC | Age: 79
Setting detail: DERMATOLOGY
End: 2024-09-03

## 2024-09-03 DIAGNOSIS — Z41.9 ENCOUNTER FOR PROCEDURE FOR PURPOSES OTHER THAN REMEDYING HEALTH STATE, UNSPECIFIED: ICD-10-CM

## 2024-09-03 PROCEDURE — ? INVENTORY

## 2024-09-03 PROCEDURE — ? PLATELET RICH PLASMA INJECTION

## 2024-09-03 PROCEDURE — ? COSMETIC FOLLOW-UP

## 2024-09-03 NOTE — PROCEDURE: PLATELET RICH PLASMA INJECTION
Treatment Number (Optional): 0
Site Of Venipuncture?: Left AC
Amount Of Blood Collected (Optional - Include Units): 22 mL
Post-Care Instructions: Physical sunscreen applied post treatment and an ice pack provided. After 2-3 days patients can return to their regular skin regimen.
Topical Anesthesia Type: 20% benzocaine, 8% lidocaine, 4% tetracaine
Anesthesia Type: 1% lidocaine with epinephrine
Venipuncture Paragraph: An alcohol pad was used to cleanse venipuncture site. Venipuncture was performed using a butterfly needle. Pressure and coflex was applied to the site. No complications were noted.
Detail Level: Zone
External Cooling Fan Speed: 5
Amount Of Blood Processed (Optional - Include Units): 9 mL
Show Additional Techniques: Yes
Consent obtained, risks reviewed.
Which Technique?: Default

## 2024-11-19 ENCOUNTER — APPOINTMENT (RX ONLY)
Dept: URBAN - METROPOLITAN AREA CLINIC 55 | Facility: CLINIC | Age: 79
Setting detail: DERMATOLOGY
End: 2024-11-19

## 2024-11-19 DIAGNOSIS — Z41.9 ENCOUNTER FOR PROCEDURE FOR PURPOSES OTHER THAN REMEDYING HEALTH STATE, UNSPECIFIED: ICD-10-CM

## 2024-11-19 PROCEDURE — ? PLATELET RICH PLASMA INJECTION

## 2024-11-19 PROCEDURE — ? INVENTORY

## 2024-11-19 NOTE — PROCEDURE: PLATELET RICH PLASMA INJECTION
Venipuncture Paragraph: An alcohol pad was used to cleanse venipuncture site. Venipuncture was performed using a butterfly needle. Pressure and coflex was applied to the site. No complications were noted.
Amount Injected At This Location In Cc (Will Not Render If 0): 0
Anesthesia Type: 1% lidocaine with epinephrine
Topical Anesthesia Type: 20% benzocaine, 8% lidocaine, 4% tetracaine
Detail Level: Zone
Show Additional Techniques: Yes
Amount Of Blood Collected (Optional - Include Units): 22 mL
Consent obtained, risks reviewed.
External Cooling Fan Speed: 5
Amount Of Blood Processed (Optional - Include Units): 9 mL
Site Of Venipuncture?: Left AC
Post-Care Instructions: Physical sunscreen applied post treatment and an ice pack provided. After 2-3 days patients can return to their regular skin regimen.
Which Technique?: Default

## 2024-12-03 ENCOUNTER — APPOINTMENT (RX ONLY)
Dept: URBAN - METROPOLITAN AREA CLINIC 55 | Facility: CLINIC | Age: 79
Setting detail: DERMATOLOGY
End: 2024-12-03

## 2024-12-03 DIAGNOSIS — Z41.9 ENCOUNTER FOR PROCEDURE FOR PURPOSES OTHER THAN REMEDYING HEALTH STATE, UNSPECIFIED: ICD-10-CM

## 2024-12-03 PROCEDURE — ? PLATELET RICH PLASMA INJECTION

## 2024-12-03 PROCEDURE — ? INVENTORY

## 2024-12-03 PROCEDURE — ? COSMETIC FOLLOW-UP

## 2024-12-03 NOTE — PROCEDURE: PLATELET RICH PLASMA INJECTION
Amount Injected At This Location In Cc (Will Not Render If 0): 0
Amount Of Blood Processed (Optional - Include Units): 9 mL
Detail Level: Zone
Site Of Venipuncture?: Left AC
Post-Care Instructions: Physical sunscreen applied post treatment and an ice pack provided. After 2-3 days patients can return to their regular skin regimen.
Anesthesia Type: 1% lidocaine with epinephrine
Venipuncture Paragraph: An alcohol pad was used to cleanse venipuncture site. Venipuncture was performed using a butterfly needle. Pressure and coflex was applied to the site. No complications were noted.
Which Technique?: Default
Topical Anesthesia Type: 20% benzocaine, 8% lidocaine, 4% tetracaine
Amount Of Blood Collected (Optional - Include Units): 22 mL
Consent obtained, risks reviewed.
External Cooling Fan Speed: 5
Show Additional Techniques: Yes

## 2025-02-20 ENCOUNTER — APPOINTMENT (OUTPATIENT)
Dept: URBAN - METROPOLITAN AREA CLINIC 55 | Facility: CLINIC | Age: 80
Setting detail: DERMATOLOGY
End: 2025-02-20

## 2025-02-20 DIAGNOSIS — Z41.9 ENCOUNTER FOR PROCEDURE FOR PURPOSES OTHER THAN REMEDYING HEALTH STATE, UNSPECIFIED: ICD-10-CM

## 2025-02-20 PROCEDURE — ? INVENTORY

## 2025-02-20 PROCEDURE — ? COSMETIC FOLLOW-UP

## 2025-02-20 PROCEDURE — ? PLATELET RICH PLASMA INJECTION

## 2025-02-20 NOTE — PROCEDURE: PLATELET RICH PLASMA INJECTION
Site Of Venipuncture?: left arm
Depth In Mm (Will Not Render If 0): 0
Amount Of Blood Processed (Optional - Include Units): 11ml
Venipuncture Paragraph: venipuncture site cleansed with 70% isopropyl alcohol. Venipuncture was performed using a butterfly needle. Pressure dressing was applied to the site. No complications were noted.
consent obtained, risks reviewed.
Detail Level: Detailed
Who Performed The Venipuncture?: Laverne
Anesthesia Type: 1% lidocaine with epinephrine
Show Additional Techniques: Yes
Post-Care Instructions: Post care instructions reviewed and given to the patient.
Which Technique?: Default
External Cooling Fan Speed: 5
Amount Of Blood Collected (Optional - Include Units): 22ml
Standard Default Technique For Making Prp: Reviewed area of treatment with the patient prior to procedure. Site was cleansed with hibclens prior to injection.

## 2025-04-07 ENCOUNTER — APPOINTMENT (OUTPATIENT)
Dept: URBAN - METROPOLITAN AREA CLINIC 55 | Facility: CLINIC | Age: 80
Setting detail: DERMATOLOGY
End: 2025-04-07

## 2025-04-07 DIAGNOSIS — Z41.9 ENCOUNTER FOR PROCEDURE FOR PURPOSES OTHER THAN REMEDYING HEALTH STATE, UNSPECIFIED: ICD-10-CM

## 2025-04-07 PROCEDURE — ? PLATELET RICH PLASMA INJECTION

## 2025-04-07 PROCEDURE — ? INVENTORY

## 2025-04-07 NOTE — PROCEDURE: PLATELET RICH PLASMA INJECTION
Post-Care Instructions: Post care instructions reviewed and given to the patient.
Depth In Mm (Will Not Render If 0): 0
Venipuncture Paragraph: venipuncture site cleansed with 70% isopropyl alcohol. Venipuncture was performed using a butterfly needle. Pressure dressing was applied to the site. No complications were noted.
External Cooling: Gayle Cryo 5
Amount Of Blood Processed (Optional - Include Units): 9ml
Number Of Tubes Drawn: 1
Amount Of Blood Collected (Optional - Include Units): 22ml
Show Additional Techniques: Yes
Anesthesia Type: 1% lidocaine with epinephrine
Detail Level: Detailed
External Cooling Fan Speed: 5
consent obtained, risks reviewed.
Standard Default Technique For Making Prp: Reviewed area of treatment with the patient prior to procedure. Site was cleansed with hibclens prior to injection.
Site Of Venipuncture?: left AC
Which Technique?: Default

## 2025-06-03 ENCOUNTER — APPOINTMENT (OUTPATIENT)
Dept: URBAN - METROPOLITAN AREA CLINIC 55 | Facility: CLINIC | Age: 80
Setting detail: DERMATOLOGY
End: 2025-06-03

## 2025-06-03 DIAGNOSIS — Z41.9 ENCOUNTER FOR PROCEDURE FOR PURPOSES OTHER THAN REMEDYING HEALTH STATE, UNSPECIFIED: ICD-10-CM

## 2025-06-03 PROCEDURE — ? INVENTORY

## 2025-06-03 PROCEDURE — ? COSMETIC FOLLOW-UP

## 2025-06-24 ENCOUNTER — APPOINTMENT (OUTPATIENT)
Dept: URBAN - METROPOLITAN AREA CLINIC 55 | Facility: CLINIC | Age: 80
Setting detail: DERMATOLOGY
End: 2025-06-24

## 2025-06-24 DIAGNOSIS — Z41.9 ENCOUNTER FOR PROCEDURE FOR PURPOSES OTHER THAN REMEDYING HEALTH STATE, UNSPECIFIED: ICD-10-CM

## 2025-06-24 PROCEDURE — ? FILLERS

## 2025-06-24 PROCEDURE — ? INVENTORY

## 2025-06-24 NOTE — PROCEDURE: FILLERS
Marionette Lines Filler Volume In Cc: 0
Consent was obtained.
Use Map Statement For Sites (Optional): No
Filler: Restylane Defyne
Post-Care Instructions: After care instructions were provided verbally and in writing.
Map Statment: See Attach Map for Complete Details
Filler: Restylane Contour
Include Cannula Size?: 25G
Anesthesia Type: 1% lidocaine with epinephrine
Include Cannula Information In Note?: Yes
Anesthesia Volume In Cc: 0.5
Detail Level: Detailed

## 2025-08-26 ENCOUNTER — APPOINTMENT (OUTPATIENT)
Dept: URBAN - METROPOLITAN AREA CLINIC 55 | Facility: CLINIC | Age: 80
Setting detail: DERMATOLOGY
End: 2025-08-26

## 2025-08-26 DIAGNOSIS — Z41.9 ENCOUNTER FOR PROCEDURE FOR PURPOSES OTHER THAN REMEDYING HEALTH STATE, UNSPECIFIED: ICD-10-CM

## 2025-08-26 PROCEDURE — ? PLATELET RICH PLASMA INJECTION

## 2025-08-26 PROCEDURE — ? INVENTORY

## 2025-08-26 PROCEDURE — ? COSMETIC FOLLOW-UP
